# Patient Record
Sex: FEMALE | Race: BLACK OR AFRICAN AMERICAN | NOT HISPANIC OR LATINO | Employment: PART TIME | URBAN - METROPOLITAN AREA
[De-identification: names, ages, dates, MRNs, and addresses within clinical notes are randomized per-mention and may not be internally consistent; named-entity substitution may affect disease eponyms.]

---

## 2018-02-27 ENCOUNTER — TELEPHONE (OUTPATIENT)
Dept: HEMATOLOGY/ONCOLOGY | Facility: CLINIC | Age: 48
End: 2018-02-27

## 2018-02-27 NOTE — TELEPHONE ENCOUNTER
----- Message from Lynette Mendieta sent at 2/27/2018  9:46 AM CST -----  Contact: Godmother Valeria Chappell [phone]  Please call her to schedule an appt for this patient.  She was discharged from [facility] on 12/16, she was admitted for anemia.  Thank you!

## 2018-03-02 ENCOUNTER — OFFICE VISIT (OUTPATIENT)
Dept: HEMATOLOGY/ONCOLOGY | Facility: CLINIC | Age: 48
End: 2018-03-02
Payer: MEDICAID

## 2018-03-02 VITALS
DIASTOLIC BLOOD PRESSURE: 84 MMHG | RESPIRATION RATE: 18 BRPM | HEIGHT: 68 IN | BODY MASS INDEX: 27.83 KG/M2 | HEART RATE: 73 BPM | WEIGHT: 183.63 LBS | SYSTOLIC BLOOD PRESSURE: 141 MMHG
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
## 2018-03-02 DIAGNOSIS — K91.89 COMPLICATIONS OF GASTRIC BYPASS SURGERY: ICD-10-CM

## 2018-03-02 DIAGNOSIS — D50.0 IRON DEFICIENCY ANEMIA DUE TO CHRONIC BLOOD LOSS: ICD-10-CM

## 2018-03-02 DIAGNOSIS — N92.0 MENORRHAGIA WITH REGULAR CYCLE: ICD-10-CM

## 2018-03-02 PROCEDURE — 99204 OFFICE O/P NEW MOD 45 MIN: CPT | Mod: ,,, | Performed by: INTERNAL MEDICINE

## 2018-03-02 NOTE — LETTER
March 2, 2018      Etelvina Kendrick MD  [address]  [address]  [place] [address]           [facility] Hematology Oncology  [address]  [address]  [place] [address]  Phone: [phone]  Fax: [phone]          Patient: Lisa Farley   MR Number: [id number]   YOB: 1970   Date of Visit: 3/2/2018       Dear Dr. Etelvina Kendrick:    Thank you for referring Lisa Farley to me for evaluation. Attached you will find relevant portions of my assessment and plan of care.    If you have questions, please do not hesitate to call me. I look forward to following Lisa Farley along with you.    Sincerely,    ABHISHEK Fitch MD    Enclosure  CC:  No Recipients    If you would like to receive this communication electronically, please contact [email or web address] or [phone] to request more information on [employer] access.    For providers and/or their staff who would like to refer a patient to [facility], please contact us through our one-stop-shop provider referral line, [facility], at [phone].    If you feel you have received this communication in error or would no longer like to receive these types of communications, please e-mail [email or web address]

## 2018-03-02 NOTE — PROGRESS NOTES
[facility] History & Physical    Subjective:      Patient ID:   Lisa Farley  47 y.o. female  1970  Melanie Mead      Chief Complaint:   anemia    HPI:  47 y.o. female was found to have a hemoglobin of 4.9, 4 units of packed red blood cells were given. She has completed 8 weeks of iron infusion.    She has anemia for several years, she has been on oral iron pills in the past but tolerated this poorly because of GI symptoms of nausea, bloating, and constipation. She was transfused recently at [facility]. She describes her menses as heavy flow lasting 8 days monthly. She denies rectal bleeding, or hematuria.    She is a  3 para 2 miscarriage 1 individual, she is status post  section x's  2. She had gastric bypass surgery back in  and has lost approximately 200 pounds, she is status post cholecystectomy.    She does not smoke, she does not drink alcohol regularly, she denies allergies to medications, she is employed with the [employer].    She says that she craves Popsicles and eats Kleenex a box per week.  She does not eat cornstarch or rocio.    She is adopted and does not know her family history.    She had EGD and colonoscopy per Dr. Navarro cancer was not identified.    ROS:   GEN: normal without any fever, night sweats or weight loss  HEENT: normal with no HA's, sore throat, stiff neck, changes in vision  CV: normal with no CP, SOB, PND, LEWIS or orthopnea  PULM: normal with no SOB, cough, hemoptysis, sputum or pleuritic pain  GI:see history of present illness  /GYN:see history of present illness  BREAST: normal with no mass, discharge, pain  SKIN: normal with no rash, erythema, bruising, or swelling     Review of patient's allergies indicates:  No Known Allergies  Social History     Social History    Marital status:      Spouse name: N/A    Number of children: N/A    Years of education: N/A     Occupational History    Not on file.     Social History Main  "Topics    Smoking status: Never Smoker    Smokeless tobacco: Never Used    Alcohol use No    Drug use: No    Sexual activity: Not on file     Other Topics Concern    Not on file     Social History Narrative    No narrative on file         Current Outpatient Prescriptions:     ondansetron (ZOFRAN) 4 MG tablet, Take 1 tablet (4 mg total) by mouth every 8 (eight) hours as needed for Nausea., Disp: 20 tablet, Rfl: 0    phentermine (ADIPEX-P) 37.5 mg tablet, Take 37.5 mg by mouth before breakfast., Disp: , Rfl:           Objective:   Vitals:  Blood pressure (!) 141/84, pulse 73, resp. rate 18, height 5' 8" (1.727 m), weight 83.3 kg (183 lb 9.6 oz).    Physical Examination:   GEN: no apparent distress, comfortable  HEAD: atraumatic and normocephalic  EYES: no pallor, no icterus  ENT:  no pharyngeal erythema, external ears WNL; no nasal discharge  NECK: no masses, thyroid normal, trachea midline, no LAD/LN's, supple  CV: RRR with no murmur; normal pulse; normal S1 and S2; no pedal edema  CHEST: Normal respiratory effort; CTAB; normal breath sounds; no wheeze or crackles  ABDOM: nontender and nondistended; soft; normal bowel sounds; no rebound/guarding,liver and spleen are not palpable  MUSC/Skeletal: ROM normal; no crepitus; joints normal; no deformities   EXTREM: no clubbing, cyanosis, inflammation or swelling  SKIN: no rashes, lesions, ulcers, petechiae   : no goldman  NEURO: grossly intact; motor/sensory WNL; no tremors  PSYCH: normal mood, affect and behavior  LYMPH: normal cervical, supraclavicular, axillary and groin LN's  BREASTS:left and right breast are nontender without palpable mass    Labs:   Lab Results   Component Value Date    WBC 7.95 01/23/2014    HGB 7.1 (L) 01/23/2014    HCT 27.1 (L) 01/23/2014    MCV 58 (L) 01/23/2014     (H) 01/23/2014    CMP  Sodium   Date Value Ref Range Status   01/23/2014 138 136 - 145 mmol/L Final     Potassium   Date Value Ref Range Status   01/23/2014 3.6 3.5 - " 5.1 mmol/L Final     Chloride   Date Value Ref Range Status   01/23/2014 107 95 - 110 mmol/L Final     CO2   Date Value Ref Range Status   01/23/2014 24 23 - 29 mmol/L Final     Glucose   Date Value Ref Range Status   01/23/2014 116 (H) 70 - 110 mg/dL Final     BUN, Bld   Date Value Ref Range Status   01/23/2014 17 6 - 20 mg/dL Final     Creatinine   Date Value Ref Range Status   01/23/2014 0.9 0.5 - 1.4 mg/dL Final     Calcium   Date Value Ref Range Status   01/23/2014 8.7 8.7 - 10.5 mg/dL Final     Total Protein   Date Value Ref Range Status   01/23/2014 7.5 6.0 - 8.4 g/dL Final     Albumin   Date Value Ref Range Status   01/23/2014 3.4 (L) 3.5 - 5.2 g/dL Final     Total Bilirubin   Date Value Ref Range Status   01/23/2014 0.5 0.1 - 1.0 mg/dL Final     Comment:     For infants and newborns, interpretation of results should be based  on gestational age, weight and in agreement with clinical  observations.  Premature Infant recommended reference ranges:  Up to 24 hours.............<8.0 mg/dL  Up to 48 hours............<12.0 mg/dL  3-5 days..................<15.0 mg/dL  6-29 days.................<15.0 mg/dL     Alkaline Phosphatase   Date Value Ref Range Status   01/23/2014 93 55 - 135 U/L Final     AST   Date Value Ref Range Status   01/23/2014 20 10 - 40 U/L Final     ALT   Date Value Ref Range Status   01/23/2014 19 10 - 44 U/L Final     Anion Gap   Date Value Ref Range Status   01/23/2014 7 (L) 8 - 16 mmol/L Final     eGFR if    Date Value Ref Range Status   01/23/2014 >60.0 >60 mL/min/1.73 m^2 Final     eGFR if non    Date Value Ref Range Status   01/23/2014 >60.0 >60 mL/min/1.73 m^2 Final     Comment:     Calculation used to obtain the estimated glomerular filtration  rate (eGFR) is the CKD-EPI equation. Since race is unknown   in our information system, the eGFR values for   -American and Non--American patients are given   for each creatinine result.     Baseline  hemoglobin was 4.9, MCV was 58. Ferritin was 3, folate was 10, I have ordered B-12. She has received 8 weeks of iron infusion. I have ordered a repeat CBC and ferritin and CMP    Assessment:   (1) 47 y.o. female with diagnosis of iron deficiency anemia secondary to heavy menstrual flow and secondary to impaired iron  absorption secondary to her gastric bypass procedure.will check her B-12 level to see if there is deficiencies secondary to decreased B-12 absorption also.    Oral iron and oral B-12 would not be effective here, because of decreased absorption secondary to her gastric bypass procedure.    She is status post iron infusion ×8 weeks. Await results of CBC, CMP, ferritin, and B-12. She follows up here with repeat lab again in 4 weeks. I have a call out for the EGD and colonoscopy report..    Dr. Mead is PMD and Dr. Navarro is GI of record.                     I have explained and the patient understands all of  the current recommendation(s). I have answered all of their questions to the best of my ability and to their complete satisfaction.

## 2018-03-05 ENCOUNTER — TELEPHONE (OUTPATIENT)
Dept: HEMATOLOGY/ONCOLOGY | Facility: CLINIC | Age: 48
End: 2018-03-05

## 2018-03-05 LAB
ALBUMIN SERPL-MCNC: 4 G/DL (ref 3.6–5.1)
ALBUMIN/GLOB SERPL: 1.3 (CALC) (ref 1–2.5)
ALP SERPL-CCNC: 74 U/L (ref 33–115)
ALT SERPL-CCNC: 33 U/L (ref 6–29)
AST SERPL-CCNC: 25 U/L (ref 10–35)
BASOPHILS # BLD AUTO: 81 CELLS/UL (ref 0–200)
BASOPHILS NFR BLD AUTO: 1.4 %
BILIRUB SERPL-MCNC: 0.3 MG/DL (ref 0.2–1.2)
BUN SERPL-MCNC: 25 MG/DL (ref 7–25)
BUN/CREAT SERPL: ABNORMAL (CALC) (ref 6–22)
CALCIUM SERPL-MCNC: 9.2 MG/DL (ref 8.6–10.2)
CHLORIDE SERPL-SCNC: 106 MMOL/L (ref 98–110)
CO2 SERPL-SCNC: 26 MMOL/L (ref 20–31)
CREAT SERPL-MCNC: 0.72 MG/DL (ref 0.5–1.1)
EOSINOPHIL # BLD AUTO: 319 CELLS/UL (ref 15–500)
EOSINOPHIL NFR BLD AUTO: 5.5 %
ERYTHROCYTE [DISTWIDTH] IN BLOOD BY AUTOMATED COUNT: 20.9 % (ref 11–15)
FERRITIN SERPL-MCNC: 10 NG/ML (ref 10–232)
GFR SERPL CREATININE-BSD FRML MDRD: 98 ML/MIN/1.73M2
GLOBULIN SER CALC-MCNC: 3.1 G/DL (CALC) (ref 1.9–3.7)
GLUCOSE SERPL-MCNC: 88 MG/DL (ref 65–99)
HCT VFR BLD AUTO: 35.2 % (ref 35–45)
HGB BLD-MCNC: 10.4 G/DL (ref 11.7–15.5)
LYMPHOCYTES # BLD AUTO: 1438 CELLS/UL (ref 850–3900)
LYMPHOCYTES NFR BLD AUTO: 24.8 %
MCH RBC QN AUTO: 22.6 PG (ref 27–33)
MCHC RBC AUTO-ENTMCNC: 29.5 G/DL (ref 32–36)
MCV RBC AUTO: 76.5 FL (ref 80–100)
MONOCYTES # BLD AUTO: 412 CELLS/UL (ref 200–950)
MONOCYTES NFR BLD AUTO: 7.1 %
NEUTROPHILS # BLD AUTO: 3550 CELLS/UL (ref 1500–7800)
NEUTROPHILS NFR BLD AUTO: 61.2 %
PLATELET # BLD AUTO: 380 THOUSAND/UL (ref 140–400)
PMV BLD REES-ECKER: ABNORMAL FL
POTASSIUM SERPL-SCNC: 3.7 MMOL/L (ref 3.5–5.3)
PROT SERPL-MCNC: 7.1 G/DL (ref 6.1–8.1)
RBC # BLD AUTO: 4.6 MILLION/UL (ref 3.8–5.1)
SODIUM SERPL-SCNC: 140 MMOL/L (ref 135–146)
VIT B12 SERPL-MCNC: 808 PG/ML (ref 200–1100)
WBC # BLD AUTO: 5.8 THOUSAND/UL (ref 3.8–10.8)

## 2018-03-05 NOTE — TELEPHONE ENCOUNTER
----- Message from Mandy Montano sent at 3/5/2018  3:15 PM CST -----  Pt called to cancel the august appt ... Is being treated by Dr Fitch

## 2018-03-20 ENCOUNTER — TELEPHONE (OUTPATIENT)
Dept: HEMATOLOGY/ONCOLOGY | Facility: CLINIC | Age: 48
End: 2018-03-20

## 2018-03-20 RX ORDER — SODIUM CHLORIDE 0.9 % (FLUSH) 0.9 %
10 SYRINGE (ML) INJECTION
Status: CANCELLED | OUTPATIENT
Start: 2018-03-27

## 2018-03-20 RX ORDER — HEPARIN 100 UNIT/ML
500 SYRINGE INTRAVENOUS
Status: CANCELLED | OUTPATIENT
Start: 2018-03-27

## 2018-03-21 NOTE — TELEPHONE ENCOUNTER
Tell her anemia is better, gone from 7 to 10 pints of blood.  12 pints is NL.  But she needs some more Fe infusions to get her HGB higher, to NL.  I have placed orders for Ferrlicet, schedule for next week.

## 2018-03-22 ENCOUNTER — TELEPHONE (OUTPATIENT)
Dept: HEMATOLOGY/ONCOLOGY | Facility: CLINIC | Age: 48
End: 2018-03-22

## 2018-04-04 ENCOUNTER — TELEPHONE (OUTPATIENT)
Dept: HEMATOLOGY/ONCOLOGY | Facility: CLINIC | Age: 48
End: 2018-04-04

## 2018-04-04 NOTE — TELEPHONE ENCOUNTER
Left 2 messages for pt and have had no call back. Called pts mother today to make sure she got messages and she said she would have her call us back regarding ferrlicit infusions.

## 2018-04-04 NOTE — TELEPHONE ENCOUNTER
Pt called me back gave her information about blood levels and that she needed to do more Ferrlicit infusions. Have a call into [facility] and waiting to schedule.

## 2018-04-05 ENCOUNTER — TELEPHONE (OUTPATIENT)
Dept: HEMATOLOGY/ONCOLOGY | Facility: CLINIC | Age: 48
End: 2018-04-05

## 2018-04-05 NOTE — TELEPHONE ENCOUNTER
Called pt no answer on Wed. 4/4 and again 4/5 L/M as no answer that Ferrlicit is scheduled for Friday 4/13 at 9:00 AM.

## 2018-04-10 ENCOUNTER — TELEPHONE (OUTPATIENT)
Dept: HEMATOLOGY/ONCOLOGY | Facility: CLINIC | Age: 48
End: 2018-04-10

## 2018-04-10 NOTE — TELEPHONE ENCOUNTER
Called pt back to make sure she received my message about Ferrlicit scheduled for Friday. She said yes and she would be there.

## 2018-04-17 RX ORDER — SODIUM CHLORIDE 0.9 % (FLUSH) 0.9 %
10 SYRINGE (ML) INJECTION
Status: CANCELLED | OUTPATIENT
Start: 2018-04-17

## 2018-04-17 RX ORDER — HEPARIN 100 UNIT/ML
500 SYRINGE INTRAVENOUS
Status: CANCELLED | OUTPATIENT
Start: 2018-04-17

## 2018-05-04 RX ORDER — HEPARIN 100 UNIT/ML
500 SYRINGE INTRAVENOUS
Status: CANCELLED | OUTPATIENT
Start: 2018-05-04

## 2018-05-04 RX ORDER — SODIUM CHLORIDE 0.9 % (FLUSH) 0.9 %
10 SYRINGE (ML) INJECTION
Status: CANCELLED | OUTPATIENT
Start: 2018-05-04

## 2018-05-11 RX ORDER — HEPARIN 100 UNIT/ML
500 SYRINGE INTRAVENOUS
Status: CANCELLED | OUTPATIENT
Start: 2018-05-11

## 2018-05-11 RX ORDER — SODIUM CHLORIDE 0.9 % (FLUSH) 0.9 %
10 SYRINGE (ML) INJECTION
Status: CANCELLED | OUTPATIENT
Start: 2018-05-11

## 2018-05-18 RX ORDER — HEPARIN 100 UNIT/ML
500 SYRINGE INTRAVENOUS
Status: CANCELLED | OUTPATIENT
Start: 2018-05-18

## 2018-05-18 RX ORDER — SODIUM CHLORIDE 0.9 % (FLUSH) 0.9 %
10 SYRINGE (ML) INJECTION
Status: CANCELLED | OUTPATIENT
Start: 2018-05-18

## 2018-06-01 RX ORDER — HEPARIN 100 UNIT/ML
500 SYRINGE INTRAVENOUS
Status: CANCELLED | OUTPATIENT
Start: 2018-06-01

## 2018-06-01 RX ORDER — SODIUM CHLORIDE 0.9 % (FLUSH) 0.9 %
10 SYRINGE (ML) INJECTION
Status: CANCELLED | OUTPATIENT
Start: 2018-06-01

## 2018-06-08 RX ORDER — SODIUM CHLORIDE 0.9 % (FLUSH) 0.9 %
10 SYRINGE (ML) INJECTION
Status: CANCELLED | OUTPATIENT
Start: 2018-06-08

## 2018-06-08 RX ORDER — HEPARIN 100 UNIT/ML
500 SYRINGE INTRAVENOUS
Status: CANCELLED | OUTPATIENT
Start: 2018-06-08

## 2018-06-15 RX ORDER — HEPARIN 100 UNIT/ML
500 SYRINGE INTRAVENOUS
Status: CANCELLED | OUTPATIENT
Start: 2018-06-15

## 2018-06-15 RX ORDER — SODIUM CHLORIDE 0.9 % (FLUSH) 0.9 %
10 SYRINGE (ML) INJECTION
Status: CANCELLED | OUTPATIENT
Start: 2018-06-15

## 2018-06-22 RX ORDER — HEPARIN 100 UNIT/ML
500 SYRINGE INTRAVENOUS
Status: CANCELLED | OUTPATIENT
Start: 2018-06-22

## 2018-06-22 RX ORDER — SODIUM CHLORIDE 0.9 % (FLUSH) 0.9 %
10 SYRINGE (ML) INJECTION
Status: CANCELLED | OUTPATIENT
Start: 2018-06-22

## 2020-05-23 ENCOUNTER — OFFICE VISIT (OUTPATIENT)
Dept: URGENT CARE | Facility: CLINIC | Age: 50
End: 2020-05-23
Payer: MEDICAID

## 2020-05-23 VITALS
BODY MASS INDEX: 27.94 KG/M2 | HEART RATE: 108 BPM | RESPIRATION RATE: 20 BRPM | WEIGHT: 178 LBS | OXYGEN SATURATION: 99 % | HEIGHT: 67 IN | TEMPERATURE: 99 F

## 2020-05-23 DIAGNOSIS — N89.8 VAGINAL ITCHING: Primary | ICD-10-CM

## 2020-05-23 DIAGNOSIS — B37.31 YEAST INFECTION INVOLVING THE VAGINA AND SURROUNDING AREA: ICD-10-CM

## 2020-05-23 DIAGNOSIS — R21 RASH OF VULVA: ICD-10-CM

## 2020-05-23 LAB
BILIRUB UR QL STRIP: NEGATIVE
GLUCOSE UR QL STRIP: NEGATIVE
KETONES UR QL STRIP: NEGATIVE
LEUKOCYTE ESTERASE UR QL STRIP: POSITIVE
PH, POC UA: 5.5 (ref 5–8)
POC BLOOD, URINE: NEGATIVE
POC NITRATES, URINE: NEGATIVE
PROT UR QL STRIP: POSITIVE
SP GR UR STRIP: 1.02 (ref 1–1.03)
UROBILINOGEN UR STRIP-ACNC: NORMAL (ref 0.1–1.1)

## 2020-05-23 PROCEDURE — 99213 PR OFFICE/OUTPT VISIT, EST, LEVL III, 20-29 MIN: ICD-10-PCS | Mod: 25,S$GLB,, | Performed by: NURSE PRACTITIONER

## 2020-05-23 PROCEDURE — 81003 URINALYSIS AUTO W/O SCOPE: CPT | Mod: QW,S$GLB,, | Performed by: NURSE PRACTITIONER

## 2020-05-23 PROCEDURE — 87481 CANDIDA DNA AMP PROBE: CPT | Mod: 59

## 2020-05-23 PROCEDURE — 81003 POCT URINALYSIS, DIPSTICK, AUTOMATED, W/O SCOPE: ICD-10-PCS | Mod: QW,S$GLB,, | Performed by: NURSE PRACTITIONER

## 2020-05-23 PROCEDURE — 99213 OFFICE O/P EST LOW 20 MIN: CPT | Mod: 25,S$GLB,, | Performed by: NURSE PRACTITIONER

## 2020-05-23 RX ORDER — FLUCONAZOLE 150 MG/1
150 TABLET ORAL DAILY
Qty: 2 TABLET | Refills: 0 | Status: SHIPPED | OUTPATIENT
Start: 2020-05-23

## 2020-05-23 RX ORDER — TERCONAZOLE 8 MG/G
1 CREAM VAGINAL NIGHTLY
Qty: 20 G | Refills: 0 | Status: SHIPPED | OUTPATIENT
Start: 2020-05-23 | End: 2020-05-26

## 2020-05-23 NOTE — PROGRESS NOTES
"Subjective:       Patient ID: Lisa Farley is a 49 y.o. female.    Vitals:  height is 5' 7" (1.702 m) and weight is 80.7 kg (178 lb). Her temperature is 98.7 °F (37.1 °C). Her pulse is 108. Her respiration is 20 and oxygen saturation is 99%.     Chief Complaint: Vaginal Itching    For 2 months pt has russ having burning and itching. She denies having discharge. She has been putting creams on it but nothing is working. She doesn't have a rash. She states she is now has itching in her anus. She has russ using peperation H , itll ease it for a few hours but the symptoms return. Pt denies pain with intercourse. She has not issues with the inside of her vagina it only on th out side on both sides. She is scratching and has some cuts on both side. She has used neosporin and other forms of bacteria cream but nothing.    Female  Problem   The patient's primary symptoms include genital itching. The patient's pertinent negatives include no missed menses or pelvic pain. This is a recurrent problem. The problem occurs constantly. Pertinent negatives include no abdominal pain, back pain, chills, dysuria, fever, frequency, hematuria, nausea, rash, urgency or vomiting. There is no history of ovarian cysts.       Constitution: Negative for chills and fever.   Neck: Negative for painful lymph nodes.   Gastrointestinal: Negative for abdominal pain, nausea and vomiting.   Genitourinary: Negative for dysuria, frequency, urgency, urine decreased, hematuria, history of kidney stones, painful menstruation, irregular menstruation, missed menses, heavy menstrual bleeding, ovarian cysts, genital trauma, vaginal pain, vaginal bleeding, vaginal odor, painful intercourse, genital sore, painful ejaculation and pelvic pain.   Musculoskeletal: Negative for back pain.   Skin: Negative for rash and lesion.   Hematologic/Lymphatic: Negative for swollen lymph nodes.       Objective:      Physical Exam   Constitutional: She is oriented to " person, place, and time. She appears well-developed and well-nourished. She is cooperative.  Non-toxic appearance. She does not have a sickly appearance. She does not appear ill. No distress.   HENT:   Head: Normocephalic and atraumatic.   Right Ear: Hearing, tympanic membrane, external ear and ear canal normal.   Left Ear: Hearing, tympanic membrane, external ear and ear canal normal.   Nose: Nose normal. No mucosal edema, rhinorrhea or nasal deformity. No epistaxis. Right sinus exhibits no maxillary sinus tenderness and no frontal sinus tenderness. Left sinus exhibits no maxillary sinus tenderness and no frontal sinus tenderness.   Mouth/Throat: Uvula is midline, oropharynx is clear and moist and mucous membranes are normal. No trismus in the jaw. Normal dentition. No uvula swelling. No oropharyngeal exudate, posterior oropharyngeal edema or posterior oropharyngeal erythema.   Eyes: Conjunctivae and lids are normal. No scleral icterus.   Neck: Trachea normal, full passive range of motion without pain and phonation normal. Neck supple. No neck rigidity. No edema and no erythema present.   Cardiovascular: Normal rate, regular rhythm, normal heart sounds, intact distal pulses and normal pulses.   Pulmonary/Chest: Effort normal and breath sounds normal. No respiratory distress. She has no decreased breath sounds. She has no rhonchi.   Abdominal: Normal appearance.   Musculoskeletal: Normal range of motion. She exhibits no edema or deformity.   Neurological: She is alert and oriented to person, place, and time. She exhibits normal muscle tone. Coordination normal.   Skin: Skin is warm, dry, intact, not diaphoretic and not pale.   Psychiatric: She has a normal mood and affect. Her speech is normal and behavior is normal. Cognition and memory are normal.   Nursing note and vitals reviewed.        Results for orders placed or performed in visit on 05/23/20   POCT Urinalysis, Dipstick, Automated, W/O Scope   Result Value  Ref Range    POC Blood, Urine Negative Negative    POC Bilirubin, Urine Negative Negative    POC Urobilinogen, Urine normal 0.1 - 1.1    POC Ketones, Urine Negative Negative    POC Protein, Urine Positive (A) Negative    POC Nitrates, Urine Negative Negative    POC Glucose, Urine Negative Negative    pH, UA 5.5 5 - 8    POC Specific Gravity, Urine 1.025 1.003 - 1.029    POC Leukocytes, Urine Positive (A) Negative       Assessment:       1. Vaginal itching    2. Rash of vulva    3. Yeast infection involving the vagina and surrounding area        Plan:         Vaginal itching  -     POCT Urinalysis, Dipstick, Automated, W/O Scope  -     Bv, Trich, Candida by DNA Probe (Swab Only)  -     terconazole (TERAZOL 3) 0.8 % vaginal cream; Place 1 applicator vaginally every evening. for 3 days  Dispense: 20 g; Refill: 0    Rash of vulva  -     Bv, Trich, Candida by DNA Probe (Swab Only)  -     terconazole (TERAZOL 3) 0.8 % vaginal cream; Place 1 applicator vaginally every evening. for 3 days  Dispense: 20 g; Refill: 0    Yeast infection involving the vagina and surrounding area  -     terconazole (TERAZOL 3) 0.8 % vaginal cream; Place 1 applicator vaginally every evening. for 3 days  Dispense: 20 g; Refill: 0  -     fluconazole (DIFLUCAN) 150 MG Tab; Take 1 tablet (150 mg total) by mouth once daily. May repeat in 3 days x 1 dose.  Dispense: 2 tablet; Refill: 0

## 2020-05-23 NOTE — PATIENT INSTRUCTIONS
Return to Urgent Care or go to ER if symptoms worsen or fail to improve.  Follow up with PCP as recommended for further management.   We will contact you with your test results in 3-5 days.      Vaginal Infection: Yeast (Candidiasis)  Yeast infection occurs when yeast in the vagina increase and attacks the vaginal tissues. Yeast is a type of fungus. These infections are often caused by a type of yeast called Candida albicans. Other species of yeast can also cause infections. Factors that may make infection more likely include recent antibiotic use, douching, or increased sex. Yeast infections are more common in women who have diabetes, or are obese or pregnant, or have a weak immune system.  Symptoms of yeast infection  · Clumpy or thin, white discharge, which may look like cottage cheese  · No odor or minimal odor  · Severe vaginal itching or burning  · Burning with urination  · Swelling, redness of vulva  · Pain during sex  Treating yeast infection  Yeast infection is treated with a vaginal antifungal cream. In some cases, antifungal pills are prescribed instead. During treatment:  · Finish all of your medicine, even if your symptoms go away.  · Apply the cream before going to bed. Lie flat after applying so that it doesn't drip out.  · Do not douche or use tampons.  · Don't rely on a diaphragm or condoms, since the cream may weaken them.  · Avoid intercourse if advised by your healthcare provider.     Should I treat a yeast infection myself?  Discuss with your healthcare provider whether you should use over-the-counter medicines to treat a yeast infection. Self-treatment may depend on whether:  · You've had a yeast infection in the past.  · You're at risk for STDs.  Call your healthcare provider if symptoms do not go away or come back after treatment.   Date Last Reviewed: 3/1/2017  © 6668-7545 [employer]. [address] [place] [address], [place] [address]. All rights reserved. This information is not  intended as a substitute for professional medical care. Always follow your healthcare professional's instructions.

## 2020-05-25 ENCOUNTER — TELEPHONE (OUTPATIENT)
Dept: URGENT CARE | Facility: CLINIC | Age: 50
End: 2020-05-25

## 2020-05-25 DIAGNOSIS — B96.89 BV (BACTERIAL VAGINOSIS): ICD-10-CM

## 2020-05-25 DIAGNOSIS — N76.0 BV (BACTERIAL VAGINOSIS): ICD-10-CM

## 2020-05-25 DIAGNOSIS — B37.31 YEAST VAGINITIS: Primary | ICD-10-CM

## 2020-05-25 LAB
BACTERIAL VAGINOSIS DNA: POSITIVE
CANDIDA GLABRATA DNA: NEGATIVE
CANDIDA KRUSEI DNA: NEGATIVE
CANDIDA RRNA VAG QL PROBE: POSITIVE
T VAGINALIS RRNA GENITAL QL PROBE: NEGATIVE

## 2020-05-25 RX ORDER — FLUCONAZOLE 150 MG/1
TABLET ORAL
Qty: 2 TABLET | Refills: 0 | Status: SHIPPED | OUTPATIENT
Start: 2020-05-25

## 2020-05-25 RX ORDER — METRONIDAZOLE 500 MG/1
500 TABLET ORAL 2 TIMES DAILY
Qty: 14 TABLET | Refills: 0 | Status: SHIPPED | OUTPATIENT
Start: 2020-05-25 | End: 2020-06-01

## 2020-05-25 NOTE — TELEPHONE ENCOUNTER
I spoke with patient and reviewed result of vaginal swab.  Positive for yeast and BV.  Sent Rx for Flagyl.  No alcohol with his medicine or within 3 days of finishing this medicine.  Also sent repeat Rx for Diflucan (in light of antibiotic).  Recheck if not improving.

## 2020-09-06 ENCOUNTER — HOSPITAL ENCOUNTER (EMERGENCY)
Facility: HOSPITAL | Age: 50
Discharge: HOME | End: 2020-09-06
Attending: EMERGENCY MEDICINE
Payer: MEDICAID

## 2020-09-06 VITALS
HEIGHT: 67 IN | RESPIRATION RATE: 18 BRPM | HEART RATE: 86 BPM | SYSTOLIC BLOOD PRESSURE: 149 MMHG | BODY MASS INDEX: 28.25 KG/M2 | TEMPERATURE: 99 F | DIASTOLIC BLOOD PRESSURE: 66 MMHG | WEIGHT: 180 LBS | OXYGEN SATURATION: 100 %

## 2020-09-06 DIAGNOSIS — Z20.822 CLOSE EXPOSURE TO COVID-19 VIRUS: Primary | ICD-10-CM

## 2020-09-06 PROCEDURE — U0003 INFECTIOUS AGENT DETECTION BY NUCLEIC ACID (DNA OR RNA); SEVERE ACUTE RESPIRATORY SYNDROME CORONAVIRUS 2 (SARS-COV-2) (CORONAVIRUS DISEASE [COVID-19]), AMPLIFIED PROBE TECHNIQUE, MAKING USE OF HIGH THROUGHPUT TECHNOLOGIES AS DESCRIBED BY CMS-2020-01-R: HCPCS

## 2020-09-06 PROCEDURE — 99283 EMERGENCY DEPT VISIT LOW MDM: CPT | Mod: 25

## 2020-09-06 NOTE — FIRST PROVIDER EVALUATION
Medical screening exam completed.  I have conducted a focused provider triage encounter, findings are as follows:    Brief history of present illness:  Head ache cough and nausea Has been exposed to COVID    There were no vitals filed for this visit.    Pertinent physical exam:  HRR Lungs CTA      Brief workup plan: chest x-ray and COIVD     Preliminary workup initiated; this workup will be continued and followed by the physician or advanced practice provider that is assigned to the patient when roomed.

## 2020-09-07 DIAGNOSIS — U07.1 COVID-19 VIRUS DETECTED: ICD-10-CM

## 2020-09-07 LAB — SARS-COV-2 RNA RESP QL NAA+PROBE: DETECTED

## 2020-09-07 NOTE — ED PROVIDER NOTES
Encounter Date: 9/6/2020       History     Chief Complaint   Patient presents with    COVID-19 Concerns     EXPOSURE     Presents for COVID testing pt was exposed to a COVID positive pt Denies symptoms with the exception of a cough         Review of patient's allergies indicates:  No Known Allergies  History reviewed. No pertinent past medical history.  Past Surgical History:   Procedure Laterality Date    ABDOMINAL SURGERY      GASTRIC SLEEVE    TUBAL LIGATION       No family history on file.  Social History     Tobacco Use    Smoking status: Never Smoker    Smokeless tobacco: Never Used   Substance Use Topics    Alcohol use: No    Drug use: No     Review of Systems   Constitutional: Negative for fever.   Respiratory: Positive for cough. Negative for shortness of breath and wheezing.    Cardiovascular: Negative for chest pain, palpitations and leg swelling.   Gastrointestinal: Negative for abdominal pain, diarrhea, nausea and vomiting.   Genitourinary: Negative for dysuria.   Musculoskeletal: Negative for back pain.   Skin: Negative for rash.   Neurological: Negative for weakness.       Physical Exam     Initial Vitals [09/06/20 1645]   BP Pulse Resp Temp SpO2   (!) 149/66 86 18 98.5 °F (36.9 °C) 100 %      MAP       --         Physical Exam    Constitutional: She appears well-developed and well-nourished.   HENT:   Head: Normocephalic and atraumatic.   Mouth/Throat: Oropharynx is clear and moist.   Eyes: Conjunctivae are normal.   Neck: Normal range of motion. Neck supple.   Cardiovascular: Normal rate and regular rhythm.   Pulmonary/Chest: Breath sounds normal. No respiratory distress. She has no wheezes. She has no rhonchi.   Musculoskeletal: Normal range of motion.   Neurological: She is alert and oriented to person, place, and time. No sensory deficit. GCS score is 15. GCS eye subscore is 4. GCS verbal subscore is 5. GCS motor subscore is 6.   Skin: Skin is warm and dry. Capillary refill takes less than  2 seconds.   Psychiatric: She has a normal mood and affect. Thought content normal.         ED Course   Procedures  Labs Reviewed   SARS-COV-2 RNA AMPLIFICATION, QUAL   SARS-COV-2 (COVID-19) QUALITATIVE PCR          Imaging Results          X-Ray Chest Lordotic Only (1 View) (Final result)  Result time 09/06/20 18:38:21   Procedure changed from X-Ray Chest PA Lateral With Lordotic View     Final result by Shamika Frank MD (09/06/20 18:38:21)                 Narrative:    PROCEDURE:   XR CHEST LORDOTIC ONLY(1 VIEW)  dated  9/6/2020 6:26 PM    CLINICAL HISTORY:   Female 49 years of age. Artifact versus right  apical abnormality on prior radiography.    TECHNIQUE: Apical lordotic view of the chest.    PREVIOUS STUDIES:  Radiography earlier in the evening.    FINDINGS:    The right pulmonary apex is clear. The lungs are clear. There is no  pleural effusion or pneumothorax. The mediastinal and hilar contours  are normal. Bones are normal.    IMPRESSION:    The lungs are clear. Previous density over the right pulmonary apex is  artifactual/outside of the thoracic cavity.    Electronically Signed by Shamika Frank on 9/6/2020 7:18 PM                             X-Ray Chest PA And Lateral (Final result)  Result time 09/06/20 17:25:18   Procedure changed from X-Ray Chest AP Portable     Final result by Shamika Frank MD (09/06/20 17:25:18)                 Narrative:    PROCEDURE:   XR CHEST PA AND LATERAL  dated  9/6/2020 5:11 PM    CLINICAL HISTORY:   Female 49 years of age.   cough    TECHNIQUE:  Frontal and lateral views of the chest were obtained.    PREVIOUS STUDIES:  None Available    FINDINGS:    Groundglass density projects over the periphery of the right pulmonary  apex. Lungs are well expanded. There is no pleural effusion or  pneumothorax. The heart is not enlarged. Cardiac, mediastinal and  hilar contours are normal. Bones are unremarkable.    IMPRESSION:    Increased density projecting over the periphery of  the right pulmonary  apex may be the patient's hair or some other structure outside of the  thoracic cavity. For clarification, apical view would be useful.  (Otherwise, this is suspicious for an infiltrate/mass for which  follow-up radiography would be indicated.)    Electronically Signed by Shamika Frank on 9/6/2020 6:04 PM                               Medical Decision Making:   Initial Assessment:   Exposed to covid pt reports cough  Have discussed this pt with Dr Thurston                                  Clinical Impression:       ICD-10-CM ICD-9-CM   1. Close Exposure to Covid-19 Virus  Z20.828                                 Maryellen Sanchez NP  09/06/20 2000

## 2020-09-08 ENCOUNTER — TELEPHONE (OUTPATIENT)
Dept: EMERGENCY MEDICINE | Facility: HOSPITAL | Age: 50
End: 2020-09-08

## 2020-09-08 NOTE — TELEPHONE ENCOUNTER
----- Message from Lee Jean MD sent at 9/8/2020  1:35 PM CDT -----  Regarding: positive covids  Head Dr. Karena Brandt will follow-up positive COVID s so I have included her on this message    Your test was POSITIVE for COVID-19 (coronavirus).       If you symptoms worsen or for any concerns, please follow up with your PCP or go to the nearest Emergency Dept.    Sincerely,     Karly Perez RN

## 2021-03-20 ENCOUNTER — IMMUNIZATION (OUTPATIENT)
Dept: PRIMARY CARE CLINIC | Facility: CLINIC | Age: 51
End: 2021-03-20
Payer: MEDICAID

## 2021-03-20 DIAGNOSIS — Z23 NEED FOR VACCINATION: Primary | ICD-10-CM

## 2021-03-20 PROCEDURE — 0001A PR IMMUNIZ ADMIN, SARS-COV-2 COVID-19 VACC, 30MCG/0.3ML, 1ST DOSE: CPT | Mod: CV19,S$GLB,, | Performed by: INTERNAL MEDICINE

## 2021-03-20 PROCEDURE — 91300 PR SARS-COV- 2 COVID-19 VACCINE, NO PRSV, 30MCG/0.3ML, IM: ICD-10-PCS | Mod: S$GLB,,, | Performed by: INTERNAL MEDICINE

## 2021-03-20 PROCEDURE — 0001A PR IMMUNIZ ADMIN, SARS-COV-2 COVID-19 VACC, 30MCG/0.3ML, 1ST DOSE: ICD-10-PCS | Mod: CV19,S$GLB,, | Performed by: INTERNAL MEDICINE

## 2021-03-20 PROCEDURE — 91300 PR SARS-COV- 2 COVID-19 VACCINE, NO PRSV, 30MCG/0.3ML, IM: CPT | Mod: S$GLB,,, | Performed by: INTERNAL MEDICINE

## 2021-03-20 RX ADMIN — Medication 0.3 ML: at 02:03

## 2021-04-11 ENCOUNTER — IMMUNIZATION (OUTPATIENT)
Dept: PRIMARY CARE CLINIC | Facility: CLINIC | Age: 51
End: 2021-04-11
Payer: MEDICAID

## 2021-04-11 DIAGNOSIS — Z23 NEED FOR VACCINATION: Primary | ICD-10-CM

## 2021-04-11 PROCEDURE — 91300 PR SARS-COV- 2 COVID-19 VACCINE, NO PRSV, 30MCG/0.3ML, IM: ICD-10-PCS | Mod: S$GLB,,, | Performed by: INTERNAL MEDICINE

## 2021-04-11 PROCEDURE — 91300 PR SARS-COV- 2 COVID-19 VACCINE, NO PRSV, 30MCG/0.3ML, IM: CPT | Mod: S$GLB,,, | Performed by: INTERNAL MEDICINE

## 2021-04-11 PROCEDURE — 0002A PR IMMUNIZ ADMIN, SARS-COV-2 COVID-19 VACC, 30MCG/0.3ML, 2ND DOSE: CPT | Mod: CV19,S$GLB,, | Performed by: INTERNAL MEDICINE

## 2021-04-11 PROCEDURE — 0002A PR IMMUNIZ ADMIN, SARS-COV-2 COVID-19 VACC, 30MCG/0.3ML, 2ND DOSE: ICD-10-PCS | Mod: CV19,S$GLB,, | Performed by: INTERNAL MEDICINE

## 2021-04-11 RX ADMIN — Medication 0.3 ML: at 04:04

## 2021-10-06 ENCOUNTER — IMMUNIZATION (OUTPATIENT)
Dept: INTERNAL MEDICINE | Facility: CLINIC | Age: 51
End: 2021-10-06
Payer: MEDICAID

## 2021-10-06 DIAGNOSIS — Z23 NEED FOR VACCINATION: Primary | ICD-10-CM

## 2021-10-06 PROCEDURE — 0003A COVID-19, MRNA, LNP-S, PF, 30 MCG/0.3 ML DOSE VACCINE: CPT | Mod: CV19,PBBFAC | Performed by: INTERNAL MEDICINE

## 2021-10-06 PROCEDURE — 91300 COVID-19, MRNA, LNP-S, PF, 30 MCG/0.3 ML DOSE VACCINE: CPT | Mod: PBBFAC

## 2021-11-16 ENCOUNTER — OFFICE VISIT (OUTPATIENT)
Dept: URGENT CARE | Facility: CLINIC | Age: 51
End: 2021-11-16
Payer: MEDICAID

## 2021-11-16 VITALS
BODY MASS INDEX: 28.25 KG/M2 | OXYGEN SATURATION: 95 % | RESPIRATION RATE: 18 BRPM | DIASTOLIC BLOOD PRESSURE: 63 MMHG | WEIGHT: 180 LBS | TEMPERATURE: 98 F | SYSTOLIC BLOOD PRESSURE: 111 MMHG | HEART RATE: 75 BPM | HEIGHT: 67 IN

## 2021-11-16 DIAGNOSIS — S90.852A FOREIGN BODY IN LEFT FOOT, INITIAL ENCOUNTER: Primary | ICD-10-CM

## 2021-11-16 DIAGNOSIS — S91.302A OPEN WOUND OF LEFT HEEL, INITIAL ENCOUNTER: ICD-10-CM

## 2021-11-16 PROCEDURE — 73650 XR CALCANEUS 2 VIEW LEFT: ICD-10-PCS | Mod: FY,LT,S$GLB, | Performed by: RADIOLOGY

## 2021-11-16 PROCEDURE — 73650 X-RAY EXAM OF HEEL: CPT | Mod: FY,LT,S$GLB, | Performed by: RADIOLOGY

## 2021-11-16 PROCEDURE — 99213 OFFICE O/P EST LOW 20 MIN: CPT | Mod: S$GLB,,, | Performed by: FAMILY MEDICINE

## 2021-11-16 PROCEDURE — 99213 PR OFFICE/OUTPT VISIT, EST, LEVL III, 20-29 MIN: ICD-10-PCS | Mod: S$GLB,,, | Performed by: FAMILY MEDICINE

## 2021-11-16 RX ORDER — MUPIROCIN 20 MG/G
OINTMENT TOPICAL
Qty: 22 G | Refills: 1 | Status: SHIPPED | OUTPATIENT
Start: 2021-11-16

## 2021-11-16 RX ORDER — CEPHALEXIN 500 MG/1
500 CAPSULE ORAL EVERY 8 HOURS
Qty: 24 CAPSULE | Refills: 0 | Status: SHIPPED | OUTPATIENT
Start: 2021-11-16 | End: 2021-11-24

## 2022-01-11 ENCOUNTER — LAB VISIT (OUTPATIENT)
Dept: PRIMARY CARE CLINIC | Facility: OTHER | Age: 52
End: 2022-01-11
Attending: INTERNAL MEDICINE
Payer: MEDICAID

## 2022-01-11 DIAGNOSIS — Z20.822 ENCOUNTER FOR LABORATORY TESTING FOR COVID-19 VIRUS: ICD-10-CM

## 2022-01-11 PROCEDURE — U0003 INFECTIOUS AGENT DETECTION BY NUCLEIC ACID (DNA OR RNA); SEVERE ACUTE RESPIRATORY SYNDROME CORONAVIRUS 2 (SARS-COV-2) (CORONAVIRUS DISEASE [COVID-19]), AMPLIFIED PROBE TECHNIQUE, MAKING USE OF HIGH THROUGHPUT TECHNOLOGIES AS DESCRIBED BY CMS-2020-01-R: HCPCS | Performed by: INTERNAL MEDICINE

## 2022-01-12 LAB
SARS-COV-2 RNA RESP QL NAA+PROBE: NOT DETECTED
SARS-COV-2- CYCLE NUMBER: NORMAL

## 2022-05-20 ENCOUNTER — IMMUNIZATION (OUTPATIENT)
Dept: PRIMARY CARE CLINIC | Facility: CLINIC | Age: 52
End: 2022-05-20
Payer: MEDICAID

## 2022-05-20 DIAGNOSIS — Z23 NEED FOR VACCINATION: Primary | ICD-10-CM

## 2022-05-20 PROCEDURE — 91305 COVID-19, MRNA, LNP-S, PF, 30 MCG/0.3 ML DOSE VACCINE (PFIZER): CPT | Mod: PBBFAC | Performed by: INTERNAL MEDICINE

## 2023-06-28 ENCOUNTER — OFFICE VISIT (OUTPATIENT)
Dept: URGENT CARE | Facility: CLINIC | Age: 53
End: 2023-06-28
Payer: MEDICAID

## 2023-06-28 VITALS
OXYGEN SATURATION: 96 % | WEIGHT: 195 LBS | HEIGHT: 67 IN | RESPIRATION RATE: 18 BRPM | DIASTOLIC BLOOD PRESSURE: 70 MMHG | BODY MASS INDEX: 30.61 KG/M2 | SYSTOLIC BLOOD PRESSURE: 123 MMHG | TEMPERATURE: 98 F | HEART RATE: 92 BPM

## 2023-06-28 DIAGNOSIS — L21.9 SEBORRHEA OF FACE: ICD-10-CM

## 2023-06-28 DIAGNOSIS — L85.3 XEROSIS OF SKIN: Primary | ICD-10-CM

## 2023-06-28 PROCEDURE — 99203 PR OFFICE/OUTPT VISIT, NEW, LEVL III, 30-44 MIN: ICD-10-PCS | Mod: S$GLB,,, | Performed by: FAMILY MEDICINE

## 2023-06-28 PROCEDURE — 99203 OFFICE O/P NEW LOW 30 MIN: CPT | Mod: S$GLB,,, | Performed by: FAMILY MEDICINE

## 2023-06-28 RX ORDER — AMMONIUM LACTATE 12 G/100G
LOTION TOPICAL 2 TIMES DAILY PRN
Qty: 225 G | Refills: 0 | Status: SHIPPED | OUTPATIENT
Start: 2023-06-28

## 2023-06-28 RX ORDER — KETOCONAZOLE 20 MG/G
CREAM TOPICAL 2 TIMES DAILY
Qty: 15 G | Refills: 0 | Status: SHIPPED | OUTPATIENT
Start: 2023-06-28 | End: 2023-07-05

## 2023-06-28 NOTE — PROGRESS NOTES
"Subjective:      Patient ID: Lisa Farley is a 52 y.o. female.    Vitals:  height is 5' 7" (1.702 m) and weight is 88.5 kg (195 lb). Her tympanic temperature is 97.9 °F (36.6 °C). Her blood pressure is 123/70 and her pulse is 92. Her respiration is 18 and oxygen saturation is 96%.     Chief Complaint: Rash    Patient has callus on right heel [id number].     Rash  This is a new problem. The current episode started in the past 7 days (x3days). The problem has been gradually worsening since onset. The affected locations include the face (left eyebrow). The rash is characterized by draining, pain and swelling. It is unknown if there was an exposure to a precipitant. Pertinent negatives include no anorexia, congestion, cough, diarrhea, eye pain, facial edema, fatigue, fever, joint pain, nail changes, rhinorrhea, shortness of breath, sore throat or vomiting. Past treatments include anti-itch cream. The treatment provided mild relief. There is no history of allergies, asthma, eczema or varicella.     Constitution: Negative for fatigue and fever.   HENT:  Negative for congestion and sore throat.    Eyes:  Negative for eye pain.   Respiratory:  Negative for cough and shortness of breath.    Gastrointestinal:  Negative for vomiting and diarrhea.   Skin:  Positive for rash.    Objective:     Physical Exam   Constitutional: She does not appear ill. No distress.   HENT:   Head: Normocephalic and atraumatic.       Ears:   Right Ear: External ear normal.   Left Ear: External ear normal.   Nose: No rhinorrhea or congestion.   Mouth/Throat: Mucous membranes are moist. Oropharynx is clear.   Rash above both eyebrows and glabellar region      Comments: Rash above both eyebrows and glabellar region  Eyes: Conjunctivae are normal. Pupils are equal, round, and reactive to light. Extraocular movement intact   Abdominal: Normal appearance.   Neurological: She is alert.   Nursing note and vitals reviewed.    Assessment:     Plan:   1. " Xerosis of skin  - ammonium lactate (LAC-HYDRIN) 12 % lotion; Apply topically 2 (two) times daily as needed for Dry Skin.  Dispense: 225 g; Refill: 0    2. Seborrhea of face  - ketoconazole (NIZORAL) 2 % cream; Apply topically 2 (two) times daily. for 7 days  Dispense: 15 g; Refill: 0

## 2025-05-07 NOTE — PROGRESS NOTES
History & Physical for Surgery  Gynecology      SUBJECTIVE:     Chief Complaint: AUB     History of Present Illness:    54 y.o. female  here for surgical evalution for Hscope D&C for the evaluation of AUB in the setting of failed EMBx. Patient reports that her periods have been 14-15 days in length beginning in January. Prior to this per cycle was monthly and regular. She reports that her LMP was 3/1/2025. She reports that she did not have a cycle in April. Patient reports that her menstrual cycle is heavy in nature. She reports that she changes her tampons 4 times daily. She reports that she has never soiled her clothing because of AUB. Patient denies any breakthrough bleeding.     TVUS 25: uterus measures 6.3 x 4.6 x 6.5 cm. Nabothian cysts of the cervix are evident. Hypoechoic, solid intramural mass of the lower uterine segment measures 1.3 x 0.8 x 0.9 cm, suspicious for a leiomyoma. Endometrial thickness of 7 mm. The aforementioned mass abuts the endometrial echo complex. No intrauterine fluid collection. In the uterine fundus, there is a 2nd intramural myometrial mass which measures 3.1 x 2.0 x 1.6 cm. Neither the right nor the left ovary is visualized sonographically.     GYN History:  Denies Hx of STIs   Denies family history of ovarian, uterine, endometrial, or colon cancer   Denies Hx of abnormal paps. Last pap 2025 - NILM/HPV negative (seen on patients phone from outside facility)    PMHx:   HTN, SARAH    Medications: None    PSHx:   Lap Dorcas, Gastric Bypass, BTL, C/S x2    Review of patient's allergies indicates:  No Known Allergies    History reviewed. No pertinent past medical history.  Past Surgical History:   Procedure Laterality Date    ABDOMINAL SURGERY      GASTRIC SLEEVE    TUBAL LIGATION       OB History          2    Para   2    Term   2            AB        Living             SAB        IAB        Ectopic        Multiple        Live Births                   Family  History   Problem Relation Name Age of Onset    No Known Problems Paternal Grandmother      No Known Problems Maternal Grandfather      No Known Problems Brother      Colon cancer Neg Hx       labor Neg Hx      Diabetes Neg Hx       Social History[1]    Current Outpatient Medications   Medication Sig    ammonium lactate (LAC-HYDRIN) 12 % lotion Apply topically 2 (two) times daily as needed for Dry Skin.    fluconazole (DIFLUCAN) 150 MG Tab Take 1 tablet (150 mg total) by mouth once daily. May repeat in 3 days x 1 dose.    fluconazole (DIFLUCAN) 150 MG Tab One by mouth as a single dose.  Repeat after 72 hr if still with symptoms    mupirocin (BACTROBAN) 2 % ointment Apply to affected area 3 times daily    ondansetron (ZOFRAN) 4 MG tablet Take 1 tablet (4 mg total) by mouth every 8 (eight) hours as needed for Nausea.    phentermine (ADIPEX-P) 37.5 mg tablet Take 37.5 mg by mouth before breakfast.    ketoconazole (NIZORAL) 2 % cream Apply topically 2 (two) times daily. for 7 days     No current facility-administered medications for this visit.     Review of Systems   Constitutional:  Negative for chills and fever.   Eyes:  Negative for visual disturbance.   Respiratory:  Negative for cough and shortness of breath.    Cardiovascular:  Negative for chest pain and palpitations.   Gastrointestinal:  Negative for abdominal pain, constipation, diarrhea, nausea and vomiting.   Genitourinary:  Positive for vaginal bleeding. Negative for dysuria, hematuria, pelvic pain, vaginal discharge, vaginal pain, vaginal dryness and vaginal odor.   Musculoskeletal:  Negative for back pain.   Neurological:  Negative for headaches.        OBJECTIVE:     Physical Exam  Exam conducted with a chaperone present.   Constitutional:       Appearance: Normal appearance.   HENT:      Head: Normocephalic and atraumatic.   Eyes:      Extraocular Movements: Extraocular movements intact.      Pupils: Pupils are equal, round, and reactive to  light.   Cardiovascular:      Rate and Rhythm: Normal rate.   Pulmonary:      Effort: Pulmonary effort is normal. No respiratory distress.   Abdominal:      General: Abdomen is flat.      Palpations: Abdomen is soft.      Tenderness: There is no abdominal tenderness.   Genitourinary:     Labia:         Right: No rash, tenderness or lesion.         Left: No rash, tenderness or lesion.       Vagina: Normal. No vaginal discharge, erythema, tenderness, bleeding or lesions.      Cervix: Normal. No cervical motion tenderness, discharge, lesion, erythema or cervical bleeding.      Uterus: Not deviated, not enlarged, not fixed and not tender.       Adnexa:         Right: No mass, tenderness or fullness.          Left: No mass, tenderness or fullness.     Musculoskeletal:         General: Normal range of motion.      Cervical back: Normal range of motion and neck supple.   Skin:     General: Skin is warm and dry.   Neurological:      Mental Status: She is alert.   Psychiatric:         Mood and Affect: Mood normal.       ASSESSMENT:       ICD-10-CM ICD-9-CM    1. Abnormal uterine bleeding (AUB)  N93.9 626.9 CBC Auto Differential      Full code      Vital signs      Insert peripheral IV      Thom[place] to [place]      POCT glucose      Notify physician if BS > 180 for hysterectomy patients      Chlorhexidine (CHG) 2% Wipes      Notify Physician/Vital Signs Parameters Urine output less than 0.5mL/kg/hr (with indwelling catheter) or 30 mL/hr (without indwelling catheter) or blood glucose greater than 200 mg/dL      Notify physician      Notify Physician - Potential Need of Opioid Reversal      Diet NPO      Chlorohexidine Gluconate Bath      Place in Outpatient      Place sequential compression device      Type & Screen Pre Op      CBC auto differential      2. Abnormal uterine bleeding  N93.9 626.9              Plan:   54 y.o. female  here for surgical evalution for Hscope D&C for the evaluation of AUB in the setting of  endometrial biopsy unable to be performed in office    I have discussed the risks, benefits, indications, and alternatives of the procedure in detail. Anatomical diagrams were used to explain the procedure. The patient verbalizes her understanding, all questions answered. Consents for Hscope D&C, and blood transfusion signed. Discussed with the patient EUA will be performed by multiple members of care team, including attending and resident physicians and possible medical students. Patient understands and is in agreement.     Pre Op Checklist:   - Medical clearance required: No. Co-morbidities include HTN, SARAH.   - Anticoagulation : Patient is not on antiocoagulation.  - Labs: ordered, but not yet obtained  - CXR/EKG: not obtained  - PAP: Normal  - TVUS as above  - EMBX unable to be obtained  - Case request placed, H&P and pre-op orders completed. Surgery book updated Yes  - To pre-op @surgery.     Olya Hills MD  Obstetrics and Gynecology - PGY2        [1]   Social History  Tobacco Use    Smoking status: Never    Smokeless tobacco: Never   Substance Use Topics    Alcohol use: Yes    Drug use: No

## 2025-05-08 ENCOUNTER — OFFICE VISIT (OUTPATIENT)
Dept: OBSTETRICS AND GYNECOLOGY | Facility: CLINIC | Age: 55
End: 2025-05-08
Payer: MEDICAID

## 2025-05-08 VITALS
HEIGHT: 67 IN | DIASTOLIC BLOOD PRESSURE: 76 MMHG | BODY MASS INDEX: 32.53 KG/M2 | WEIGHT: 207.25 LBS | SYSTOLIC BLOOD PRESSURE: 128 MMHG

## 2025-05-08 DIAGNOSIS — N93.9 ABNORMAL UTERINE BLEEDING: ICD-10-CM

## 2025-05-08 DIAGNOSIS — N93.9 ABNORMAL UTERINE BLEEDING: Primary | ICD-10-CM

## 2025-05-08 DIAGNOSIS — N93.9 ABNORMAL UTERINE BLEEDING (AUB): Primary | ICD-10-CM

## 2025-05-08 PROCEDURE — 1159F MED LIST DOCD IN RCRD: CPT | Mod: CPTII,,,

## 2025-05-08 PROCEDURE — 99204 OFFICE O/P NEW MOD 45 MIN: CPT | Mod: S$PBB,,,

## 2025-05-08 PROCEDURE — 3078F DIAST BP <80 MM HG: CPT | Mod: CPTII,,,

## 2025-05-08 PROCEDURE — 99213 OFFICE O/P EST LOW 20 MIN: CPT | Mod: PBBFAC

## 2025-05-08 PROCEDURE — 3008F BODY MASS INDEX DOCD: CPT | Mod: CPTII,,,

## 2025-05-08 PROCEDURE — 3074F SYST BP LT 130 MM HG: CPT | Mod: CPTII,,,

## 2025-05-08 PROCEDURE — 99999 PR PBB SHADOW E&M-EST. PATIENT-LVL III: CPT | Mod: PBBFAC,,,

## 2025-05-08 PROCEDURE — 1160F RVW MEDS BY RX/DR IN RCRD: CPT | Mod: CPTII,,,

## 2025-05-08 RX ORDER — MUPIROCIN 20 MG/G
OINTMENT TOPICAL
OUTPATIENT
Start: 2025-05-08

## 2025-05-08 RX ORDER — FAMOTIDINE 20 MG/1
20 TABLET, FILM COATED ORAL
OUTPATIENT
Start: 2025-05-08

## 2025-05-08 RX ORDER — SODIUM CHLORIDE 9 MG/ML
INJECTION, SOLUTION INTRAVENOUS CONTINUOUS
OUTPATIENT
Start: 2025-05-08

## 2025-05-08 NOTE — Clinical Note
FYI - scheduled this patient from [facility] that week you are staff.  Let me know if you have any questions.  AT

## 2025-05-15 ENCOUNTER — TELEPHONE (OUTPATIENT)
Dept: OBSTETRICS AND GYNECOLOGY | Facility: CLINIC | Age: 55
End: 2025-05-15
Payer: MEDICAID

## 2025-05-15 DIAGNOSIS — N88.2 CERVICAL STENOSIS (UTERINE CERVIX): Primary | ICD-10-CM

## 2025-05-15 RX ORDER — MISOPROSTOL 200 UG/1
200 TABLET ORAL ONCE
Qty: 1 TABLET | Refills: 0 | Status: SHIPPED | OUTPATIENT
Start: 2025-05-15 | End: 2025-05-15

## 2025-05-15 NOTE — TELEPHONE ENCOUNTER
Called patient regarding Hscope D&C scheduled for 5/27/25.     Patient understands to be NPO at midnight.    Cytotec sent to the patients pharmacy and patient instructed to place the pill vaginally the morning of her procedure.      Patient instructed to have someone available to pick her up after her procedure.     Patient in agreement with the plan and all questions were answered.     Olya Hills MD  Obstetrics and Gynecology - PGY2

## 2025-05-19 ENCOUNTER — ANESTHESIA EVENT (OUTPATIENT)
Dept: SURGERY | Facility: OTHER | Age: 55
End: 2025-05-19

## 2025-05-20 ENCOUNTER — HOSPITAL ENCOUNTER (OUTPATIENT)
Dept: PREADMISSION TESTING | Facility: OTHER | Age: 55
Discharge: HOME | End: 2025-05-20
Payer: MEDICAID

## 2025-05-21 ENCOUNTER — HOSPITAL ENCOUNTER (OUTPATIENT)
Dept: PREADMISSION TESTING | Facility: OTHER | Age: 55
Discharge: HOME | End: 2025-05-21
Payer: MEDICAID

## 2025-05-21 VITALS — BODY MASS INDEX: 32.96 KG/M2 | WEIGHT: 210 LBS | HEIGHT: 67 IN

## 2025-05-21 RX ORDER — LIDOCAINE HYDROCHLORIDE 10 MG/ML
0.5 INJECTION, SOLUTION EPIDURAL; INFILTRATION; INTRACAUDAL; PERINEURAL ONCE
OUTPATIENT
Start: 2025-05-21 | End: 2025-05-21

## 2025-05-21 RX ORDER — LIRAGLUTIDE 6 MG/ML
INJECTION SUBCUTANEOUS DAILY
COMMUNITY
Start: 2025-02-05 | End: 2026-02-19

## 2025-05-21 RX ORDER — SODIUM CHLORIDE, SODIUM LACTATE, POTASSIUM CHLORIDE, CALCIUM CHLORIDE 600; 310; 30; 20 MG/100ML; MG/100ML; MG/100ML; MG/100ML
INJECTION, SOLUTION INTRAVENOUS CONTINUOUS
OUTPATIENT
Start: 2025-05-21

## 2025-05-21 RX ORDER — ACETAMINOPHEN 500 MG
1000 TABLET ORAL
OUTPATIENT
Start: 2025-05-21 | End: 2025-05-21

## 2025-05-21 NOTE — DISCHARGE INSTRUCTIONS
Information to Prepare you for your Surgery    PRE-ADMIT TESTING   [address]  [place] [address]  [address]     Your surgery has been scheduled at [facility]. We are pleased to have the opportunity to serve you. For Further Information please call [phone].    On the day of surgery please report to Registration on the 1st floor of the [facility].    CONTACT YOUR PHYSICIAN'S OFFICE THE DAY PRIOR TO YOUR SURGERY TO OBTAIN YOUR ARRIVAL TIME.     The evening before surgery do not eat anything after 9 p.m. ( this includes hard candy, chewing gum and mints).  You may only have GATORADE, POWERADE AND WATER  from 9 p.m. until you leave your home.   DO NOT DRINK ANY LIQUIDS ON THE WAY TO THE HOSPITAL.      Why does your anesthesiologist allow you to drink Gatorade/Powerade before surgery?  Gatorade/Powerade helps to increase your comfort before surgery and to decrease your nausea after surgery.   The carbohydrates in Gatorade/Powerade help reduce your body's stress response to surgery.  If you are a diabetic-drink only water prior to surgery.    Outpatient Surgery- May allow 2 adults (18 and older)/ Support Persons (1 being the designated ) for all surgical/procedural patients. A breastfeeding mother will be allowed her infant and 2 adult Support Persons. No one under the age of 18 will be allowed in the building.    MEDICATION INSTRUCTIONS: TAKE medications checked off by the Anesthesiologist on your Medication List.    Surgery Patients:  If you take ASPIRIN - Your PHYSICIAN/SURGEON will need to inform you IF/OR when you need to stop taking aspirin prior to your surgery.     Starting the week prior to surgery, do not take any medications containing IBUPROFEN or NSAIDS (Advil, Aleve, BC, Celebrex, Goody's, Ketorolac, Meloxicam, Mobic, Motrin, Naproxen, Toradol, etc).  If you are not sure if you should take a medicine please call your surgeon's office.  You may take Tylenol.    Do  Not Wear any make-up (especially eye make-up) to surgery. Please remove any false eyelashes or eyelash extensions. If you arrive the day of surgery with makeup/eyelashes on you will be required to remove prior to surgery. (There is a risk of corneal abrasions if eye makeup/eyelash extensions are not removed)    Leave all valuables at home.   Do Not wear any jewelry or watches, including any metal in body piercings. Jewelry must be removed prior to coming to the hospital.  There is a possibility that rings that are unable to be removed may be cut off if they are on the surgical extremity.    Please remove all hair extensions, wigs, clips and any other metal accessories/ ornaments from your hair.  These items may pose a flammable/fire risk in Surgery and must be removed.    Do not shave your surgical area at least 5 days prior to your surgery. The surgical prep will be performed at the hospital according to Infection Control regulations.    Contact Lens must be removed before surgery. Either do not wear the contact lens or bring a case and solution for storage.  Please bring a container for eyeglasses or dentures as required.  Bring any paperwork your physician has provided, such as consent forms,  history and physicals, doctor's orders, etc.   Bring comfortable clothes that are loose fitting to wear upon discharge. Take into consideration the type of surgery being performed.  Maintain your diet as advised per your physician the day prior to surgery.    Adequate rest the night before surgery is advised.   Park in the Parking lot behind the hospital or in the [facility] across the street from the parking lot. Parking is complimentary.  If you will be discharged the same day as your procedure, please arrange for a responsible adult to drive you home or to accompany you if traveling by taxi.   YOU WILL NOT BE PERMITTED TO DRIVE OR TO LEAVE THE HOSPITAL ALONE AFTER SURGERY.   If you are being discharged the  same day, it is strongly recommended that you arrange for someone to remain with you for the first 24 hrs following your surgery.    The Surgeon will speak to your family/visitor after your surgery regarding the outcome of your surgery and post op care.  The Surgeon may speak to you after your surgery, but there is a possibility you may not remember the details.  Please check with your family members regarding the conversation with the Surgeon.    We strongly recommend whoever is bringing you home be present for discharge instructions.  This will ensure a thorough understanding for your post op home care.              Bathing Instructions with Hibiclens  Shower the evening before and morning of your procedure with Chlorhexidine (Hibiclens)    Do not use Chlorhexidine on your face or genitals. Do not get in your eyes.  Wash your face with water and your regular face wash/soap  Use your regular shampoo  Apply Chlorhexidine (Hibiclens) directly on your skin or on a wet washcloth and wash gently. When showering: Move away from the shower stream when applying Chlorhexidine (Hibiclens) to avoid rinsing off too soon.  Rinse thoroughly with warm water  Do not dilute Chlorhexidine (Hibiclens)   Dry off as usual, do not use any deodorant, powder, body lotions, perfume, after shave or cologne.     If the patient has fever, cough, or signs/symptoms of Flu or Covid please do not come in for your surgery.   Contact your surgeon and your primary care physician for further instructions.   Please also call [facility] Outpatient Surgery [phone]. The unit opens at 5 AM.    If applicable, please bring your blood pressure & diabetes medications the day of surgery.

## 2025-05-21 NOTE — PRE ADMISSION SCREENING
Phone complete  Information to Prepare you for your Surgery    PRE-ADMIT TESTING & SURGERY  [address]  EN[address]       Your surgery has been scheduled at [facility]. We are pleased to have the opportunity to serve you. For Further Information please call [phone].    On the day of surgery please report to the Registration Desk on the 1st floor of the [facility]. This is a 4 story red brick building on the corner of [address], [place]. Turn onto [address] to access the parking lot behind the [facility] at the corner of [facility].    CONTACT YOUR PHYSICIAN'S OFFICE THE DAY PRIOR TO YOUR SURGERY TO OBTAIN YOUR ARRIVAL TIME.  Dr Jeansonne [phone]    The evening before surgery do not eat anything after 9 p.m. ( this includes hard candy, chewing gum and mints).  You may only have GATORADE, POWERADE AND WATER  from 9 p.m. until you leave your home.   DO NOT DRINK ANY LIQUIDS ON THE WAY TO THE HOSPITAL.      Why does your anesthesiologist allow you to drink Gatorade/Powerade before surgery?  Gatorade/Powerade helps to increase your comfort before surgery and to decrease your nausea after surgery.   The carbohydrates in Gatorade/Powerade help reduce your body's stress response to surgery.    If you are a diabetic-drink only water prior to surgery.    Outpatient Surgery- May allow 2 adults (18 and older)/ Support Persons (1 being the designated ) for all surgical/procedural patients. A breastfeeding mother will be allowed her infant and 2 adult Support Persons. No one under the age of 18 will be allowed in the building.    MEDICATION INSTRUCTIONS:   STOP ADIPEX & VICTOZA today! Do not restart until after surgery.    Surgery Patients:  If you take ASPIRIN - Your PHYSICIAN/SURGEON will need to inform you IF/OR when you need to stop taking aspirin prior to your surgery.     Starting the week prior to surgery, do not take any medications  containing IBUPROFEN or NSAIDS (Advil, Aleve, BC, Goody's, Ketorolac, Meloxicam, Mobic, Motrin, Naproxen, Toradol, etc).  If you are not sure if you should take a medicine, please call your surgeon's office.  You may take Tylenol.    Do Not Wear any make-up (especially eye make-up) to surgery. Please remove any false eyelashes or eyelash extensions. If you arrive the day of surgery with makeup/eyelashes on you will be required to remove prior to surgery. (There is a risk of corneal abrasions if eye makeup/eyelash extensions are not removed)    Leave all valuables at home.   Do Not wear any jewelry or watches, including any metal in body piercings. Jewelry must be removed prior to coming to the hospital.  There is a possibility that rings that are unable to be removed may be cut off if they are on the surgical extremity.    Please remove all hair extensions, wigs, clips and any other metal accessories/ ornaments from your hair.  These items may pose a flammable/fire risk in Surgery and must be removed.    Do not shave your surgical area at least 5 days prior to your surgery. The surgical prep will be performed at the hospital according to Infection Control regulations.    Contact Lens must be removed before surgery. Either do not wear the contact lens or bring a case and solution for storage.  Please bring a container for eyeglasses or dentures as required.  Bring any paperwork your physician has provided, such as consent forms,  history and physicals, doctor's orders, etc.   Bring comfortable clothes that are loose fitting to wear upon discharge. Take into consideration the type of surgery being performed.  Maintain your diet as advised per your physician the day prior to surgery.    Adequate rest the night before surgery is advised.   Park in the Parking lot behind the hospital or in the [facility] across the street from the parking lot. Parking is complimentary.  If you will be discharged the same day  as your procedure, please arrange for a responsible adult to drive you home or to accompany you if traveling by taxi.   YOU WILL NOT BE PERMITTED TO DRIVE OR TO LEAVE THE HOSPITAL ALONE AFTER SURGERY.   If you are being discharged the same day, it is strongly recommended that you arrange for someone to remain with you for the first 24 hrs following your surgery.    The Surgeon will speak to your family/visitor after your surgery regarding the outcome of your surgery and post op care.  The Surgeon may speak to you after your surgery, but there is a possibility you may not remember the details.  Please check with your family members regarding the conversation with the Surgeon.    We strongly recommend whoever is bringing you home be present for discharge instructions.  This will ensure a thorough understanding for your post op home care.    If the patient has fever, cough, or signs/symptoms of Flu or Covid please do not come in for your surgery.   Contact your surgeon and your primary care physician for further instructions.               Bathing Instructions  Shower (no bath) the evening before and the morning of your procedure with antibacterial soap.  Wash your face with water and your regular face wash/soap  Use your regular shampoo  Dry off as usual Do not use any deodorant, powder, body lotions, perfume, after shave or cologne.     Thanks,  PUMA Moreno

## 2025-05-27 ENCOUNTER — ANESTHESIA (OUTPATIENT)
Dept: SURGERY | Facility: OTHER | Age: 55
End: 2025-05-27